# Patient Record
Sex: FEMALE | Race: WHITE | Employment: PART TIME | URBAN - METROPOLITAN AREA
[De-identification: names, ages, dates, MRNs, and addresses within clinical notes are randomized per-mention and may not be internally consistent; named-entity substitution may affect disease eponyms.]

---
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
## 2019-01-03 ENCOUNTER — OFFICE VISIT (OUTPATIENT)
Dept: FAMILY MEDICINE CLINIC | Age: 19
End: 2019-01-03

## 2019-01-03 VITALS
OXYGEN SATURATION: 97 % | HEIGHT: 66 IN | SYSTOLIC BLOOD PRESSURE: 108 MMHG | HEART RATE: 79 BPM | TEMPERATURE: 97.9 F | WEIGHT: 129.2 LBS | BODY MASS INDEX: 20.76 KG/M2 | DIASTOLIC BLOOD PRESSURE: 60 MMHG | RESPIRATION RATE: 18 BRPM

## 2019-01-03 DIAGNOSIS — R53.81 MALAISE AND FATIGUE: ICD-10-CM

## 2019-01-03 DIAGNOSIS — N94.6 DYSMENORRHEA: Primary | ICD-10-CM

## 2019-01-03 DIAGNOSIS — R53.83 MALAISE AND FATIGUE: ICD-10-CM

## 2019-01-03 DIAGNOSIS — Z00.00 ENCOUNTER FOR MEDICAL EXAMINATION TO ESTABLISH CARE: ICD-10-CM

## 2019-01-03 DIAGNOSIS — Z86.69 HX OF MIGRAINE HEADACHES: ICD-10-CM

## 2019-01-03 LAB
BILIRUB UR QL STRIP: NEGATIVE
GLUCOSE UR-MCNC: NEGATIVE MG/DL
HCG URINE, QL. (POC): NEGATIVE
KETONES P FAST UR STRIP-MCNC: NEGATIVE MG/DL
PH UR STRIP: 7.5 [PH] (ref 4.6–8)
PROT UR QL STRIP: NORMAL
SP GR UR STRIP: 1.02 (ref 1–1.03)
UA UROBILINOGEN AMB POC: NORMAL (ref 0.2–1)
URINALYSIS CLARITY POC: NORMAL
URINALYSIS COLOR POC: NORMAL
URINE BLOOD POC: NORMAL
URINE LEUKOCYTES POC: NEGATIVE
URINE NITRITES POC: NEGATIVE
VALID INTERNAL CONTROL?: YES

## 2019-01-03 NOTE — PROGRESS NOTES
CHIEF COMPLAINT:  
Chief Complaint Patient presents with  Menstrual Problem  
  painful menses HISTORY OF PRESENT ILLNESS:  
18F with h/o painful menses and has at times had heavy menses. She is sexually active. Has never had a pap smear before. Denies any odor or discharge. No suspicious lesions. She is still having a fairly heavy menses having only started 4-days ago. LMP: 12/30/2018 PAST MEDICAL HISTORY: 
Past Medical History:  
Diagnosis Date  Headache PAST SURGICAL HISTORY: 
Past Surgical History:  
Procedure Laterality Date  HX HEENT    
 
 
 
MEDICATIONS: 
Not on medications. ALLERGIES: 
No Known Allergies 
  
SOCIAL HISTORY:  
Social History Socioeconomic History  Marital status: SINGLE Spouse name: Not on file  Number of children: Not on file  Years of education: Not on file  Highest education level: Sophomore in college - [employer] Social Needs  Financial resource strain: Not on file  Food insecurity - worry: Not on file  Food insecurity - inability: Not on file  Transportation needs - medical: Not on file  Transportation needs - non-medical: Not on file Occupational History  Not on file Tobacco Use  Smoking status: Never Smoker  Smokeless tobacco: Never Used Substance and Sexual Activity  Alcohol use: Yes Comment: occaisionally  Drug use: No  
 Sexual activity: Yes  
  Partners: Male Birth control/protection: Condom FAMILY HISTORY:  
History reviewed. No pertinent family history. REVIEW OF SYSTEMS: 
Review of Systems - Negative except for documented in the HPI. PHYSICAL EXAMINATION: 
/60 (BP 1 Location: Left arm, BP Patient Position: Sitting)   Pulse 79   Temp 97.9 °F (36.6 °C) (Oral)   Resp 18   Ht 5' 6\" (1.676 m)   Wt 129 lb 3.2 oz (58.6 kg)   LMP 12/30/2018 (Exact Date)   SpO2 97%   BMI 20.85 kg/m² Visit Vitals /60 (BP 1 Location: Left arm, BP Patient Position: Sitting) Pulse 79 Temp 97.9 °F (36.6 °C) (Oral) Resp 18 Ht 5' 6\" (1.676 m) Wt 129 lb 3.2 oz (58.6 kg) LMP 12/30/2018 (Exact Date) SpO2 97% BMI 20.85 kg/m² General:  Alert, cooperative, no distress. Head:  Normocephalic, without obvious abnormality, atraumatic. Eyes:  Conjunctivae/corneas clear. Pupils equal, round, reactive to light. Extraocular movements intact. Lungs:   Clear to auscultation bilaterally. Chest wall:  No tenderness or deformity. Heart:  Regular rate and rhythm, S1, S2 normal, no murmur, click, rub, or gallop. Extremities: Extremities normal, atraumatic, no cyanosis or edema. Pulses: 2+ and symmetric all extremities. Skin: Skin color, texture, turgor normal. No rashes or lesions. Lymph nodes: Cervical, supraclavicular, and axillary nodes normal.  
Neurologic: CNII-XII intact. Normal strength, sensation, and reflexes throughout. LABORATORY DATA: 
Results for orders placed or performed in visit on 01/03/19 METABOLIC PANEL, COMPREHENSIVE Result Value Ref Range Glucose 79 65 - 99 mg/dL BUN 6 6 - 20 mg/dL Creatinine 0.61 0.57 - 1.00 mg/dL GFR est non- >59 mL/min/1.73 GFR est  >59 mL/min/1.73  
 BUN/Creatinine ratio 10 9 - 23 Sodium 141 134 - 144 mmol/L Potassium 4.3 3.5 - 5.2 mmol/L Chloride 107 (H) 96 - 106 mmol/L  
 CO2 18 (L) 20 - 29 mmol/L Calcium 9.9 8.7 - 10.2 mg/dL Protein, total 7.7 6.0 - 8.5 g/dL Albumin 4.9 3.5 - 5.5 g/dL GLOBULIN, TOTAL 2.8 1.5 - 4.5 g/dL A-G Ratio 1.8 1.2 - 2.2 Bilirubin, total 0.4 0.0 - 1.2 mg/dL Alk. phosphatase 73 39 - 117 IU/L  
 AST (SGOT) 27 0 - 40 IU/L  
 ALT (SGPT) 16 0 - 32 IU/L  
CBC WITH AUTOMATED DIFF Result Value Ref Range WBC 4.4 3.4 - 10.8 x10E3/uL  
 RBC 4.44 3.77 - 5.28 x10E6/uL HGB 13.5 11.1 - 15.9 g/dL HCT 38.8 34.0 - 46.6 %  MCV 87 79 - 97 fL  
 MCH 30.4 26.6 - 33.0 pg  
 MCHC 34.8 31.5 - 35.7 g/dL  
 RDW 12.9 12.3 - 15.4 % PLATELET 906 598 - 976 x10E3/uL NEUTROPHILS 58 Not Estab. % Lymphocytes 31 Not Estab. % MONOCYTES 9 Not Estab. % EOSINOPHILS 1 Not Estab. % BASOPHILS 1 Not Estab. %  
 ABS. NEUTROPHILS 2.6 1.4 - 7.0 x10E3/uL Abs Lymphocytes 1.3 0.7 - 3.1 x10E3/uL  
 ABS. MONOCYTES 0.4 0.1 - 0.9 x10E3/uL  
 ABS. EOSINOPHILS 0.0 0.0 - 0.4 x10E3/uL  
 ABS. BASOPHILS 0.1 0.0 - 0.2 x10E3/uL IMMATURE GRANULOCYTES 0 Not Estab. %  
 ABS. IMM. GRANS. 0.0 0.0 - 0.1 x10E3/uL THYROID CASCADE PROFILE Result Value Ref Range TSH 1.280 0.450 - 4.500 uIU/mL AMB POC URINALYSIS DIP STICK MANUAL W/O MICRO Result Value Ref Range Color (UA POC) CIT Group Clarity (UA POC) Cloudy Glucose (UA POC) Negative Negative Bilirubin (UA POC) Negative Negative Ketones (UA POC) Negative Negative Specific gravity (UA POC) 1.020 1.001 - 1.035 Blood (UA POC) 3+ Negative pH (UA POC) 7.5 4.6 - 8.0 Protein (UA POC) 1+ Negative Urobilinogen (UA POC) 0.2 mg/dL 0.2 - 1 Nitrites (UA POC) Negative Negative Leukocyte esterase (UA POC) Negative Negative AMB POC URINE PREGNANCY TEST, VISUAL COLOR COMPARISON Result Value Ref Range VALID INTERNAL CONTROL POC Yes HCG urine, Ql. (POC) Negative Negative IMPRESSION AND PLAN:  
Diagnoses and all orders for this visit: 1. Dysmenorrhea -     AMB POC URINALYSIS DIP STICK MANUAL W/O MICRO 
-     AMB POC URINE PREGNANCY TEST, VISUAL COLOR COMPARISON 
-     METABOLIC PANEL, COMPREHENSIVE 2. Hx of migraine headaches 3. Encounter for medical examination to establish care 4. Malaise and fatigue 
-     CBC WITH AUTOMATED DIFF 
-     THYROID CASCADE PROFILE She will return for full physical and pap smear. We discussed worrisome symptoms. Reviewed tests done in the office.   
 
I have discussed the diagnosis with the patient and the intended treatment plan as seen in the above orders. The patient has received an after-visit summary and questions were answered concerning future plans. Asked to return should symptoms worsen or not improve with treatment. Any pending labs and studies will be relayed to patient when they become available. Pt verbalizes understanding of plan of care and denies further questions or concerns at this time. ' 
 
Follow-up Disposition: 
Return if symptoms worsen or fail to improve. Marcin Larson MD 
 
Patient Instructions Painful Menstrual Cramps in Teens: Care Instructions Your Care Instructions Painful menstrual cramps (called dysmenorrhea) are one of the most common reasons women seek medical attention. Painful periods can cause cramping in the back, thighs, and belly. You may also have diarrhea, constipation, or nausea. Some women get dizzy. Pain medicine and home treatment can help ease your cramps. Follow-up care is a key part of your treatment and safety. Be sure to make and go to all appointments, and call your doctor if you are having problems. It's also a good idea to know your test results and keep a list of the medicines you take. How can you care for yourself at home? · Take anti-inflammatory medicines to reduce pain, such as ibuprofen (Advil, Motrin) and naproxen (Aleve), for pain from cramps. ? Start taking the recommended dose of pain medicine as soon as you start to feel pain or the day before your period starts. Keep taking the medicine for as many days as your cramps last. 
? If anti-inflammatory medicines do not relieve the pain, try acetaminophen (Tylenol). ? Do not take two or more pain medicines at the same time unless the doctor told you to. Many pain medicines have acetaminophen, which is Tylenol. Too much acetaminophen (Tylenol) can be harmful. ? Talk to your doctor or pharmacist before you take any of these medicines.  They may not be safe if you are taking other medicines or have other health problems. ? Be safe with medicines. Read and follow all instructions on the label. · Put a warm water bottle, a heating pad set on low, or a warm cloth on your belly. Heat improves blood flow and may relieve pelvic pain. · Lie down and put a pillow under your knees, or lie on your side and bring your knees up to your chest. This will help relieve back pressure. · Use pads instead of tampons. · Get plenty of exercise every day. This improves blood flow and may decrease pain. Go for a walk or jog, ride your bike, or play sports with friends. When should you call for help? Call your doctor now or seek immediate medical care if: 
  · You have severe vaginal bleeding.  
  · You have new or worse belly or pelvic pain.  
 Watch closely for changes in your health, and be sure to contact your doctor if: 
  · You have unusual vaginal bleeding.  
  · You do not get better as expected. Where can you learn more? Go to http://alexis-christel.info/. Enter J523 in the search box to learn more about \"Painful Menstrual Cramps in Teens: Care Instructions. \" Current as of: May 15, 2018 Content Version: 11.8 © 5997-3742 Healthwise, [employer]. Care instructions adapted under license by [employer] (which disclaims liability or warranty for this information). If you have questions about a medical condition or this instruction, always ask your healthcare professional. [place] [address] any warranty or liability for your use of this information.

## 2019-01-03 NOTE — PROGRESS NOTES
Identified pt with two pt identifiers(name and ). No chief complaint on file. Health Maintenance Due Topic  DTaP/Tdap/Td series (1 - Tdap)  HPV Age 9Y-34Y (1 - Female 3-dose series)  Influenza Age 5 to Adult Wt Readings from Last 3 Encounters:  
No data found for [employer] Temp Readings from Last 3 Encounters:  
No data found for Temp BP Readings from Last 3 Encounters:  
No data found for BP Pulse Readings from Last 3 Encounters:  
No data found for Pulse Learning Assessment: 
:  
 
Learning Assessment 1/3/2019 PRIMARY LEARNER Patient HIGHEST LEVEL OF EDUCATION - PRIMARY LEARNER  2 YEARS OF COLLEGE  
BARRIERS PRIMARY LEARNER NONE  
CO-LEARNER CAREGIVER No  
PRIMARY LANGUAGE ENGLISH  
LEARNER PREFERENCE PRIMARY LISTENING  
  DEMONSTRATION  
ANSWERED BY Clementina Bosch RELATIONSHIP SELF Depression Screening: 
:  
 
PHQ over the last two weeks 1/3/2019 Little interest or pleasure in doing things Not at all Feeling down, depressed, irritable, or hopeless Not at all Total Score PHQ 2 0 Fall Risk Assessment: 
:  
 
No flowsheet data found. Abuse Screening: 
:  
 
Abuse Screening Questionnaire 1/3/2019 Do you ever feel afraid of your partner? Dianne Manus Are you in a relationship with someone who physically or mentally threatens you? Dianne Manus Is it safe for you to go home? Palma Soliman Coordination of Care Questionnaire: 
:  
 
1) Have you been to an emergency room, urgent care clinic since your last visit? Yes [id number] Hospitalized since your last visit? no          
 
2) Have you seen or consulted any other health care providers outside of [address] [place] [address] since your last visit? no  (Include any pap smears or colon screenings in this section.) 3) Do you have an Advance Directive on file? no 
Are you interested in receiving information about Advance Directives? no 
 
Reviewed record in preparation for visit and have obtained necessary documentation. Medication reconciliation up to date and corrected with patient at this time.

## 2019-01-03 NOTE — PATIENT INSTRUCTIONS
Painful Menstrual Cramps in Teens: Care Instructions Your Care Instructions Painful menstrual cramps (called dysmenorrhea) are one of the most common reasons women seek medical attention. Painful periods can cause cramping in the back, thighs, and belly. You may also have diarrhea, constipation, or nausea. Some women get dizzy. Pain medicine and home treatment can help ease your cramps. Follow-up care is a key part of your treatment and safety. Be sure to make and go to all appointments, and call your doctor if you are having problems. It's also a good idea to know your test results and keep a list of the medicines you take. How can you care for yourself at home? · Take anti-inflammatory medicines to reduce pain, such as ibuprofen (Advil, Motrin) and naproxen (Aleve), for pain from cramps. ? Start taking the recommended dose of pain medicine as soon as you start to feel pain or the day before your period starts. Keep taking the medicine for as many days as your cramps last. 
? If anti-inflammatory medicines do not relieve the pain, try acetaminophen (Tylenol). ? Do not take two or more pain medicines at the same time unless the doctor told you to. Many pain medicines have acetaminophen, which is Tylenol. Too much acetaminophen (Tylenol) can be harmful. ? Talk to your doctor or pharmacist before you take any of these medicines. They may not be safe if you are taking other medicines or have other health problems. ? Be safe with medicines. Read and follow all instructions on the label. · Put a warm water bottle, a heating pad set on low, or a warm cloth on your belly. Heat improves blood flow and may relieve pelvic pain. · Lie down and put a pillow under your knees, or lie on your side and bring your knees up to your chest. This will help relieve back pressure. · Use pads instead of tampons. · Get plenty of exercise every day.  This improves blood flow and may decrease pain. Go for a walk or jog, ride your bike, or play sports with friends. When should you call for help? Call your doctor now or seek immediate medical care if: 
  · You have severe vaginal bleeding.  
  · You have new or worse belly or pelvic pain.  
 Watch closely for changes in your health, and be sure to contact your doctor if: 
  · You have unusual vaginal bleeding.  
  · You do not get better as expected. Where can you learn more? Go to http://alexis-christel.info/. Enter N247 in the search box to learn more about \"Painful Menstrual Cramps in Teens: Care Instructions. \" Current as of: May 15, 2018 Content Version: 11.8 © 2523-3052 Healthwise, Incorporated. Care instructions adapted under license by [employer] (which disclaims liability or warranty for this information). If you have questions about a medical condition or this instruction, always ask your healthcare professional. [address] any warranty or liability for your use of this information.

## 2019-01-04 LAB
ALBUMIN SERPL-MCNC: 4.9 G/DL (ref 3.5–5.5)
ALBUMIN/GLOB SERPL: 1.8 {RATIO} (ref 1.2–2.2)
ALP SERPL-CCNC: 73 IU/L (ref 39–117)
ALT SERPL-CCNC: 16 IU/L (ref 0–32)
AST SERPL-CCNC: 27 IU/L (ref 0–40)
BASOPHILS # BLD AUTO: 0.1 X10E3/UL (ref 0–0.2)
BASOPHILS NFR BLD AUTO: 1 %
BILIRUB SERPL-MCNC: 0.4 MG/DL (ref 0–1.2)
BUN SERPL-MCNC: 6 MG/DL (ref 6–20)
BUN/CREAT SERPL: 10 (ref 9–23)
CALCIUM SERPL-MCNC: 9.9 MG/DL (ref 8.7–10.2)
CHLORIDE SERPL-SCNC: 107 MMOL/L (ref 96–106)
CO2 SERPL-SCNC: 18 MMOL/L (ref 20–29)
CREAT SERPL-MCNC: 0.61 MG/DL (ref 0.57–1)
EOSINOPHIL # BLD AUTO: 0 X10E3/UL (ref 0–0.4)
EOSINOPHIL NFR BLD AUTO: 1 %
ERYTHROCYTE [DISTWIDTH] IN BLOOD BY AUTOMATED COUNT: 12.9 % (ref 12.3–15.4)
GLOBULIN SER CALC-MCNC: 2.8 G/DL (ref 1.5–4.5)
GLUCOSE SERPL-MCNC: 79 MG/DL (ref 65–99)
HCT VFR BLD AUTO: 38.8 % (ref 34–46.6)
HGB BLD-MCNC: 13.5 G/DL (ref 11.1–15.9)
IMM GRANULOCYTES # BLD AUTO: 0 X10E3/UL (ref 0–0.1)
IMM GRANULOCYTES NFR BLD AUTO: 0 %
LYMPHOCYTES # BLD AUTO: 1.3 X10E3/UL (ref 0.7–3.1)
LYMPHOCYTES NFR BLD AUTO: 31 %
MCH RBC QN AUTO: 30.4 PG (ref 26.6–33)
MCHC RBC AUTO-ENTMCNC: 34.8 G/DL (ref 31.5–35.7)
MCV RBC AUTO: 87 FL (ref 79–97)
MONOCYTES # BLD AUTO: 0.4 X10E3/UL (ref 0.1–0.9)
MONOCYTES NFR BLD AUTO: 9 %
NEUTROPHILS # BLD AUTO: 2.6 X10E3/UL (ref 1.4–7)
NEUTROPHILS NFR BLD AUTO: 58 %
PLATELET # BLD AUTO: 212 X10E3/UL (ref 150–379)
POTASSIUM SERPL-SCNC: 4.3 MMOL/L (ref 3.5–5.2)
PROT SERPL-MCNC: 7.7 G/DL (ref 6–8.5)
RBC # BLD AUTO: 4.44 X10E6/UL (ref 3.77–5.28)
SODIUM SERPL-SCNC: 141 MMOL/L (ref 134–144)
TSH SERPL DL<=0.005 MIU/L-ACNC: 1.28 UIU/ML (ref 0.45–4.5)
WBC # BLD AUTO: 4.4 X10E3/UL (ref 3.4–10.8)

## 2019-01-10 ENCOUNTER — OFFICE VISIT (OUTPATIENT)
Dept: FAMILY MEDICINE CLINIC | Age: 19
End: 2019-01-10

## 2019-01-10 ENCOUNTER — HOSPITAL ENCOUNTER (OUTPATIENT)
Dept: LAB | Age: 19
Discharge: HOME | End: 2019-01-10
Payer: COMMERCIAL

## 2019-01-10 VITALS
SYSTOLIC BLOOD PRESSURE: 100 MMHG | WEIGHT: 128 LBS | HEIGHT: 66 IN | HEART RATE: 74 BPM | TEMPERATURE: 98 F | DIASTOLIC BLOOD PRESSURE: 56 MMHG | OXYGEN SATURATION: 98 % | BODY MASS INDEX: 20.57 KG/M2 | RESPIRATION RATE: 20 BRPM

## 2019-01-10 DIAGNOSIS — N76.1 CHRONIC VAGINITIS: ICD-10-CM

## 2019-01-10 DIAGNOSIS — Z01.419 WELL WOMAN EXAM WITH ROUTINE GYNECOLOGICAL EXAM: Primary | ICD-10-CM

## 2019-01-10 DIAGNOSIS — Z12.4 SCREENING FOR CERVICAL CANCER: ICD-10-CM

## 2019-01-10 PROCEDURE — 88175 CYTOPATH C/V AUTO FLUID REDO: CPT

## 2019-01-10 PROCEDURE — 87624 HPV HI-RISK TYP POOLED RSLT: CPT

## 2019-01-10 NOTE — PATIENT INSTRUCTIONS
HPV Vaccine: Care Instructions Your Care Instructions The HPV (human papillomavirus) vaccine protects against HPV. HPV is a common sexually transmitted infection (STI). There are many types of HPV. Some types of the virus can cause genital warts in men and women. Other types can cause cervical or oral cancer and some uncommon cancers, such as anal and vaginal cancer. Experts recommend that girls and boys age 6 or 15 get the HPV vaccine, but the vaccine can be given from age 5 to 32. Children ages 5 to 15 get the vaccine in a series of two shots over 6 months. Children age 13 and older get the vaccine as a three-dose series. For the vaccine to work best, all shots in the series must be given. The best time to get the vaccine is before a person becomes sexually active. This is because the vaccine works best before there is any chance of infection with HPV. When the vaccine is given at this time, it can prevent almost all infection by the types of HPV the vaccine guards against. If someone has already been infected with the virus, the vaccine does not provide protection against the virus. Having the HPV vaccine does not change a woman's need for Pap tests. Women who have had the HPV vaccine should follow the same Pap test schedule as women who have not had the vaccine. Follow-up care is a key part of your treatment and safety. Be sure to make and go to all appointments, and call your doctor if you are having problems. It's also a good idea to know your test results and keep a list of the medicines you take. How can you care for yourself at home? · Common side effects of getting the vaccine include headache, fever, and redness or swelling at the site of the shot. Take an over-the-counter pain medicine, such as acetaminophen (Tylenol) or ibuprofen (Advil, Motrin), if you have any of these side effects after the shot. Be safe with medicines. Read and follow all instructions on the label. · Put ice or a cold pack on the sore area for 10 to 20 minutes at a time. Put a thin cloth between the ice and your skin. · If you are a parent of a child who's getting the shot, talk to your child about HPV and the vaccine. It's a chance to teach your child about safer sex and STIs. Having your child get the shot doesn't mean you're giving your child permission to have sex. When should you call for help? Call 911 anytime you think you may need emergency care. For example, call if: 
  · You have symptoms of a severe allergic reaction. These may include: 
? Sudden raised, red areas (hives) all over your body. ? Swelling of the throat, mouth, lips, or tongue. ? Trouble breathing. ? Passing out (losing consciousness). Or you may feel very lightheaded or suddenly feel weak, confused, or restless.  
 Call your doctor now or seek immediate medical care if: 
  · You have symptoms of an allergic reaction, such as: ? A rash or hives (raised, red areas on the skin). ? Itching. ? Swelling. ? Belly pain, nausea, or vomiting.  
  · You have a fever for more than 1 day.  
 Watch closely for changes in your health, and be sure to contact your doctor if you have any problems. Where can you learn more? Go to http://alexis-christel.info/. Enter C525 in the search box to learn more about \"HPV Vaccine: Care Instructions. \" Current as of: June 18, 2018 Content Version: 11.8 © 0639-9139 [employer]. Care instructions adapted under license by [employer] (which disclaims liability or warranty for this information). If you have questions about a medical condition or this instruction, always ask your healthcare professional. [place] [address] any warranty or liability for your use of this information. Well Visit, Ages 25 to 48: Care Instructions Your Care Instructions Physical exams can help you stay healthy.  Your doctor has checked your overall health and may have suggested ways to take good care of yourself. He or she also may have recommended tests. At home, you can help prevent illness with healthy eating, regular exercise, and other steps. Follow-up care is a key part of your treatment and safety. Be sure to make and go to all appointments, and call your doctor if you are having problems. It's also a good idea to know your test results and keep a list of the medicines you take. How can you care for yourself at home? · Reach and stay at a healthy weight. This will lower your risk for many problems, such as obesity, diabetes, heart disease, and high blood pressure. · Get at least 30 minutes of physical activity on most days of the week. Walking is a good choice. You also may want to do other activities, such as running, swimming, cycling, or playing tennis or team sports. Discuss any changes in your exercise program with your doctor. · Do not smoke or allow others to smoke around you. If you need help quitting, talk to your doctor about stop-smoking programs and medicines. These can increase your chances of quitting for good. · Talk to your doctor about whether you have any risk factors for sexually transmitted infections (STIs). Having one sex partner (who does not have STIs and does not have sex with anyone else) is a good way to avoid these infections. · Use birth control if you do not want to have children at this time. Talk with your doctor about the choices available and what might be best for you. · Protect your skin from too much sun. When you're outdoors from 10 a.m. to 4 p.m., stay in the shade or cover up with clothing and a hat with a wide brim. Wear sunglasses that block UV rays. Even when it's cloudy, put broad-spectrum sunscreen (SPF 30 or higher) on any exposed skin. · See a dentist one or two times a year for checkups and to have your teeth cleaned. · Wear a seat belt in the car. · Drink alcohol in moderation, if at all. That means no more than 2 drinks a day for men and 1 drink a day for women. Follow your doctor's advice about when to have certain tests. These tests can spot problems early. For everyone · Cholesterol. Have the fat (cholesterol) in your blood tested after age 21. Your doctor will tell you how often to have this done based on your age, family history, or other things that can increase your risk for heart disease. · Blood pressure. Have your blood pressure checked during a routine doctor visit. Your doctor will tell you how often to check your blood pressure based on your age, your blood pressure results, and other factors. · Vision. Talk with your doctor about how often to have a glaucoma test. 
· Diabetes. Ask your doctor whether you should have tests for diabetes. · Colon cancer. Have a test for colon cancer at age 48. You may have one of several tests. If you are younger than 48, you may need a test earlier if you have any risk factors. Risk factors include whether you already had a precancerous polyp removed from your colon or whether your parent, brother, sister, or child has had colon cancer. For women · Breast exam and mammogram. Talk to your doctor about when you should have a clinical breast exam and a mammogram. Medical experts differ on whether and how often women under 50 should have these tests. Your doctor can help you decide what is right for you. · Pap test and pelvic exam. Begin Pap tests at age 24. A Pap test is the best way to find cervical cancer. The test often is part of a pelvic exam. Ask how often to have this test. 
· Tests for sexually transmitted infections (STIs). Ask whether you should have tests for STIs. You may be at risk if you have sex with more than one person, especially if your partners do not wear condoms. For men · Tests for sexually transmitted infections (STIs).  Ask whether you should have tests for STIs. You may be at risk if you have sex with more than one person, especially if you do not wear a condom. · Testicular cancer exam. Ask your doctor whether you should check your testicles regularly. · Prostate exam. Talk to your doctor about whether you should have a blood test (called a PSA test) for prostate cancer. Experts differ on whether and when men should have this test. Some experts suggest it if you are older than 39 and are -American or have a father or brother who got prostate cancer when he was younger than 72. When should you call for help? Watch closely for changes in your health, and be sure to contact your doctor if you have any problems or symptoms that concern you. Where can you learn more? Go to http://alexis-christel.info/. Enter P072 in the search box to learn more about \"Well Visit, Ages 25 to 48: Care Instructions. \" Current as of: March 29, 2018 Content Version: 11.8 © 8985-4191 [employer]. Care instructions adapted under license by [employer] (which disclaims liability or warranty for this information). If you have questions about a medical condition or this instruction, always ask your healthcare professional. [address] any warranty or liability for your use of this information.

## 2019-01-10 NOTE — LETTER
1/10/2019 11:58 AM 
 
Ms. Dashawn Hill [address] [facility] [place] [phone] [id number] Dear Dashawn Hill: 
 
Please find your most recent results below. Results for orders placed or performed in visit on 01/03/19 METABOLIC PANEL, COMPREHENSIVE Result Value Ref Range Glucose 79 65 - 99 mg/dL BUN 6 6 - 20 mg/dL Creatinine 0.61 0.57 - 1.00 mg/dL GFR est non- >59 mL/min/1.73 GFR est  >59 mL/min/1.73  
 BUN/Creatinine ratio 10 9 - 23 Sodium 141 134 - 144 mmol/L Potassium 4.3 3.5 - 5.2 mmol/L Chloride 107 (H) 96 - 106 mmol/L  
 CO2 18 (L) 20 - 29 mmol/L Calcium 9.9 8.7 - 10.2 mg/dL Protein, total 7.7 6.0 - 8.5 g/dL Albumin 4.9 3.5 - 5.5 g/dL GLOBULIN, TOTAL 2.8 1.5 - 4.5 g/dL A-G Ratio 1.8 1.2 - 2.2 Bilirubin, total 0.4 0.0 - 1.2 mg/dL Alk. phosphatase 73 39 - 117 IU/L  
 AST (SGOT) 27 0 - 40 IU/L  
 ALT (SGPT) 16 0 - 32 IU/L  
CBC WITH AUTOMATED DIFF Result Value Ref Range WBC 4.4 3.4 - 10.8 x10E3/uL  
 RBC 4.44 3.77 - 5.28 x10E6/uL HGB 13.5 11.1 - 15.9 g/dL HCT 38.8 34.0 - 46.6 % MCV 87 79 - 97 fL  
 MCH 30.4 26.6 - 33.0 pg  
 MCHC 34.8 31.5 - 35.7 g/dL  
 RDW 12.9 12.3 - 15.4 % PLATELET 914 319 - 682 x10E3/uL NEUTROPHILS 58 Not Estab. % Lymphocytes 31 Not Estab. % MONOCYTES 9 Not Estab. % EOSINOPHILS 1 Not Estab. % BASOPHILS 1 Not Estab. %  
 ABS. NEUTROPHILS 2.6 1.4 - 7.0 x10E3/uL Abs Lymphocytes 1.3 0.7 - 3.1 x10E3/uL  
 ABS. MONOCYTES 0.4 0.1 - 0.9 x10E3/uL  
 ABS. EOSINOPHILS 0.0 0.0 - 0.4 x10E3/uL  
 ABS. BASOPHILS 0.1 0.0 - 0.2 x10E3/uL IMMATURE GRANULOCYTES 0 Not Estab. %  
 ABS. IMM. GRANS. 0.0 0.0 - 0.1 x10E3/uL THYROID CASCADE PROFILE Result Value Ref Range TSH 1.280 0.450 - 4.500 uIU/mL AMB POC URINALYSIS DIP STICK MANUAL W/O MICRO Result Value Ref Range Color (UA POC) Hildy Goody Clarity (UA POC) Cloudy Glucose (UA POC) Negative Negative Bilirubin (UA POC) Negative Negative Ketones (UA POC) Negative Negative Specific gravity (UA POC) 1.020 1.001 - 1.035 Blood (UA POC) 3+ Negative pH (UA POC) 7.5 4.6 - 8.0 Protein (UA POC) 1+ Negative Urobilinogen (UA POC) 0.2 mg/dL 0.2 - 1 Nitrites (UA POC) Negative Negative Leukocyte esterase (UA POC) Negative Negative AMB POC URINE PREGNANCY TEST, VISUAL COLOR COMPARISON Result Value Ref Range VALID INTERNAL CONTROL POC Yes HCG urine, Ql. (POC) Negative Negative RECOMMENDATIONS: 
 
Labs were excellent! We will await the pap smear and go from there. No change in diet or medications. Repeat in 12-months. Please call me if you have any questions: [phone] Sincerely, Agustin Castaneda MD

## 2019-01-10 NOTE — PROGRESS NOTES
Identified pt with two pt identifiers(name and ). Chief Complaint Patient presents with  Gyn Exam  
  Pap Health Maintenance Due Topic  DTaP/Tdap/Td series (1 - Tdap)  HPV Age 9Y-34Y (1 - Female 3-dose series)  Influenza Age 5 to Adult Wt Readings from Last 3 Encounters:  
19 129 lb 3.2 oz (58.6 kg) (54 %, Z= 0.10)* * Growth percentiles are based on CDC (Girls, 2-20 Years) data. Temp Readings from Last 3 Encounters:  
19 97.9 °F (36.6 °C) (Oral) BP Readings from Last 3 Encounters:  
19 108/60 (29 %, Z = -0.56 /  20 %, Z = -0.86)* *BP percentiles are based on the 2017 AAP Clinical Practice Guideline for girls Pulse Readings from Last 3 Encounters:  
19 79 Learning Assessment: 
:  
 
Learning Assessment 1/3/2019 PRIMARY LEARNER Patient HIGHEST LEVEL OF EDUCATION - PRIMARY LEARNER  2 YEARS OF COLLEGE  
BARRIERS PRIMARY LEARNER NONE  
CO-LEARNER CAREGIVER No  
PRIMARY LANGUAGE ENGLISH  
LEARNER PREFERENCE PRIMARY LISTENING  
  DEMONSTRATION  
ANSWERED BY Roberto Hutchinson RELATIONSHIP SELF Depression Screening: 
:  
 
PHQ over the last two weeks 1/10/2019 Little interest or pleasure in doing things Not at all Feeling down, depressed, irritable, or hopeless Not at all Total Score PHQ 2 0 Fall Risk Assessment: 
:  
 
No flowsheet data found. Abuse Screening: 
:  
 
Abuse Screening Questionnaire 1/3/2019 Do you ever feel afraid of your partner? Clearnce Beams Are you in a relationship with someone who physically or mentally threatens you? Clearnce Beams Is it safe for you to go home? Shahriar Arredondo Coordination of Care Questionnaire: 
:  
 
1) Have you been to an emergency room, urgent care clinic since your last visit? no  
Hospitalized since your last visit? no          
 
2) Have you seen or consulted any other health care providers outside of [address] [place] [address] since your last visit? no  (Include any pap smears or colon screenings in this section.) 3) Do you have an Advance Directive on file? no 
Are you interested in receiving information about Advance Directives? no 
 
Reviewed record in preparation for visit and have obtained necessary documentation. Medication reconciliation up to date and corrected with patient at this time.

## 2019-01-10 NOTE — PROGRESS NOTES
Subjective:  
22 y.o. female for Well Woman Check. Patient's last menstrual period was 12/30/2018 (exact date). Social History: has sex with males. Pertinent past medical hstory: no history of HTN, DVT, CAD, DM, liver disease, migraines or smoking. Patient Active Problem List  
 Diagnosis Date Noted  Hx of migraine headaches 01/03/2019 No Known Allergies Past Medical History:  
Diagnosis Date  Headache Past Surgical History:  
Procedure Laterality Date  HX HEENT History reviewed. No pertinent family history. Social History Tobacco Use  Smoking status: Never Smoker  Smokeless tobacco: Never Used Substance Use Topics  Alcohol use: Yes Comment: occaisionally ROS:  Feeling well. No dyspnea or chest pain on exertion. No abdominal pain, change in bowel habits, black or bloody stools. No urinary tract symptoms. GYN ROS: normal menses, no abnormal bleeding, pelvic pain or discharge, no breast pain or new or enlarging lumps on self exam. No neurological complaints. Objective:  
 
Visit Vitals /56 (BP 1 Location: Left arm, BP Patient Position: Sitting) Pulse 74 Temp 98 °F (36.7 °C) (Oral) Resp 20 Ht 5' 6\" (1.676 m) Wt 128 lb (58.1 kg) LMP 12/30/2018 (Exact Date) SpO2 98% BMI 20.66 kg/m² The patient appears well, alert, oriented x 3, in no distress. ENT normal.  Neck supple. No adenopathy or thyromegaly. JOSE. Lungs are clear, good air entry, no wheezes, rhonchi or rales. S1 and S2 normal, no murmurs, regular rate and rhythm. Abdomen soft without tenderness, guarding, mass or organomegaly. Extremities show no edema, normal peripheral pulses. Neurological is normal, no focal findings. BREAST EXAM: breasts appear normal, no suspicious masses, no skin or nipple changes or axillary nodes PELVIC EXAM: normal external genitalia, vulva, vagina, cervix, uterus and adnexa Assessment/Plan: Diagnoses and all orders for this visit: 
 
1. Well woman exam with routine gynecological exam 
 Anticipatory guidance discussed. Immunizations reviewed HM updated. 2. Chronic vaginitis 
-     NUSWAB VAGINITIS PLUS 3. Screening for cervical cancer -     PAP IG, APTIMA HPV AND RFX 16/18,45 ([id number]); Future I have discussed the diagnosis with the patient and the intended treatment plan as seen in the above orders. The patient has received an after-visit summary and questions were answered concerning future plans. Asked to return should symptoms worsen or not improve with treatment. Any pending labs and studies will be relayed to patient when they become available. Pt verbalizes understanding of plan of care and denies further questions or concerns at this time. Follow-up Disposition: 
Return if symptoms worsen or fail to improve. Rudy Mcbride MD 
 
Patient Instructions HPV Vaccine: Care Instructions Your Care Instructions The HPV (human papillomavirus) vaccine protects against HPV. HPV is a common sexually transmitted infection (STI). There are many types of HPV. Some types of the virus can cause genital warts in men and women. Other types can cause cervical or oral cancer and some uncommon cancers, such as anal and vaginal cancer. Experts recommend that girls and boys age 6 or 15 get the HPV vaccine, but the vaccine can be given from age 5 to 32. Children ages 5 to 15 get the vaccine in a series of two shots over 6 months. Children age 13 and older get the vaccine as a three-dose series. For the vaccine to work best, all shots in the series must be given. The best time to get the vaccine is before a person becomes sexually active. This is because the vaccine works best before there is any chance of infection with HPV.  When the vaccine is given at this time, it can prevent almost all infection by the types of HPV the vaccine guards against. If someone has already been infected with the virus, the vaccine does not provide protection against the virus. Having the HPV vaccine does not change a woman's need for Pap tests. Women who have had the HPV vaccine should follow the same Pap test schedule as women who have not had the vaccine. Follow-up care is a key part of your treatment and safety. Be sure to make and go to all appointments, and call your doctor if you are having problems. It's also a good idea to know your test results and keep a list of the medicines you take. How can you care for yourself at home? · Common side effects of getting the vaccine include headache, fever, and redness or swelling at the site of the shot. Take an over-the-counter pain medicine, such as acetaminophen (Tylenol) or ibuprofen (Advil, Motrin), if you have any of these side effects after the shot. Be safe with medicines. Read and follow all instructions on the label. · Put ice or a cold pack on the sore area for 10 to 20 minutes at a time. Put a thin cloth between the ice and your skin. · If you are a parent of a child who's getting the shot, talk to your child about HPV and the vaccine. It's a chance to teach your child about safer sex and STIs. Having your child get the shot doesn't mean you're giving your child permission to have sex. When should you call for help? Call 911 anytime you think you may need emergency care. For example, call if: 
  · You have symptoms of a severe allergic reaction. These may include: 
? Sudden raised, red areas (hives) all over your body. ? Swelling of the throat, mouth, lips, or tongue. ? Trouble breathing. ? Passing out (losing consciousness). Or you may feel very lightheaded or suddenly feel weak, confused, or restless.  
 Call your doctor now or seek immediate medical care if: 
  · You have symptoms of an allergic reaction, such as: ? A rash or hives (raised, red areas on the skin). ? Itching. ? Swelling. ? Belly pain, nausea, or vomiting.  
  · You have a fever for more than 1 day.  
 Watch closely for changes in your health, and be sure to contact your doctor if you have any problems. Where can you learn more? Go to http://alexis-christel.info/. Enter C525 in the search box to learn more about \"HPV Vaccine: Care Instructions. \" Current as of: June 18, 2018 Content Version: 11.8 © 5782-3955 [employer]. Care instructions adapted under license by [employer] (which disclaims liability or warranty for this information). If you have questions about a medical condition or this instruction, always ask your healthcare professional. [address] any warranty or liability for your use of this information. Well Visit, Ages 25 to 48: Care Instructions Your Care Instructions Physical exams can help you stay healthy. Your doctor has checked your overall health and may have suggested ways to take good care of yourself. He or she also may have recommended tests. At home, you can help prevent illness with healthy eating, regular exercise, and other steps. Follow-up care is a key part of your treatment and safety. Be sure to make and go to all appointments, and call your doctor if you are having problems. It's also a good idea to know your test results and keep a list of the medicines you take. How can you care for yourself at home? · Reach and stay at a healthy weight. This will lower your risk for many problems, such as obesity, diabetes, heart disease, and high blood pressure. · Get at least 30 minutes of physical activity on most days of the week. Walking is a good choice. You also may want to do other activities, such as running, swimming, cycling, or playing tennis or team sports. Discuss any changes in your exercise program with your doctor. · Do not smoke or allow others to smoke around you.  If you need help quitting, talk to your doctor about stop-smoking programs and medicines. These can increase your chances of quitting for good. · Talk to your doctor about whether you have any risk factors for sexually transmitted infections (STIs). Having one sex partner (who does not have STIs and does not have sex with anyone else) is a good way to avoid these infections. · Use birth control if you do not want to have children at this time. Talk with your doctor about the choices available and what might be best for you. · Protect your skin from too much sun. When you're outdoors from 10 a.m. to 4 p.m., stay in the shade or cover up with clothing and a hat with a wide brim. Wear sunglasses that block UV rays. Even when it's cloudy, put broad-spectrum sunscreen (SPF 30 or higher) on any exposed skin. · See a dentist one or two times a year for checkups and to have your teeth cleaned. · Wear a seat belt in the car. · Drink alcohol in moderation, if at all. That means no more than 2 drinks a day for men and 1 drink a day for women. Follow your doctor's advice about when to have certain tests. These tests can spot problems early. For everyone · Cholesterol. Have the fat (cholesterol) in your blood tested after age 21. Your doctor will tell you how often to have this done based on your age, family history, or other things that can increase your risk for heart disease. · Blood pressure. Have your blood pressure checked during a routine doctor visit. Your doctor will tell you how often to check your blood pressure based on your age, your blood pressure results, and other factors. · Vision. Talk with your doctor about how often to have a glaucoma test. 
· Diabetes. Ask your doctor whether you should have tests for diabetes. · Colon cancer. Have a test for colon cancer at age 48. You may have one of several tests.  If you are younger than 48, you may need a test earlier if you have any risk factors. Risk factors include whether you already had a precancerous polyp removed from your colon or whether your parent, brother, sister, or child has had colon cancer. For women · Breast exam and mammogram. Talk to your doctor about when you should have a clinical breast exam and a mammogram. Medical experts differ on whether and how often women under 50 should have these tests. Your doctor can help you decide what is right for you. · Pap test and pelvic exam. Begin Pap tests at age 24. A Pap test is the best way to find cervical cancer. The test often is part of a pelvic exam. Ask how often to have this test. 
· Tests for sexually transmitted infections (STIs). Ask whether you should have tests for STIs. You may be at risk if you have sex with more than one person, especially if your partners do not wear condoms. For men · Tests for sexually transmitted infections (STIs). Ask whether you should have tests for STIs. You may be at risk if you have sex with more than one person, especially if you do not wear a condom. · Testicular cancer exam. Ask your doctor whether you should check your testicles regularly. · Prostate exam. Talk to your doctor about whether you should have a blood test (called a PSA test) for prostate cancer. Experts differ on whether and when men should have this test. Some experts suggest it if you are older than [address] and are -American or have a father or brother who got prostate cancer when he was younger than 72. When should you call for help? Watch closely for changes in your health, and be sure to contact your doctor if you have any problems or symptoms that concern you. Where can you learn more? Go to http://alexis-christel.info/. Enter P072 in the search box to learn more about \"Well Visit, Ages 25 to 48: Care Instructions. \" Current as of: March 29, 2018 Content Version: 11.8 © 5180-8393 [employer]. Care instructions adapted under license by [employer] (which disclaims liability or warranty for this information). If you have questions about a medical condition or this instruction, always ask your healthcare professional. [address] any warranty or liability for your use of this information.

## 2019-01-13 LAB
A VAGINAE DNA VAG QL NAA+PROBE: NORMAL SCORE
BVAB2 DNA VAG QL NAA+PROBE: NORMAL SCORE
C ALBICANS DNA VAG QL NAA+PROBE: NEGATIVE
C GLABRATA DNA VAG QL NAA+PROBE: NEGATIVE
C TRACH RRNA SPEC QL NAA+PROBE: NEGATIVE
MEGA1 DNA VAG QL NAA+PROBE: NORMAL SCORE
N GONORRHOEA RRNA SPEC QL NAA+PROBE: NEGATIVE
T VAGINALIS RRNA SPEC QL NAA+PROBE: NEGATIVE

## 2021-06-09 ENCOUNTER — OFFICE VISIT (OUTPATIENT)
Dept: FAMILY MEDICINE CLINIC | Age: 21
End: 2021-06-09
Payer: COMMERCIAL

## 2021-06-09 VITALS
HEART RATE: 96 BPM | OXYGEN SATURATION: 97 % | WEIGHT: 147 LBS | HEIGHT: 66 IN | SYSTOLIC BLOOD PRESSURE: 108 MMHG | TEMPERATURE: 98.8 F | RESPIRATION RATE: 18 BRPM | DIASTOLIC BLOOD PRESSURE: 72 MMHG | BODY MASS INDEX: 23.63 KG/M2

## 2021-06-09 DIAGNOSIS — E55.9 VITAMIN D DEFICIENCY: ICD-10-CM

## 2021-06-09 DIAGNOSIS — N92.6 IRREGULAR MENSES: Primary | ICD-10-CM

## 2021-06-09 DIAGNOSIS — Z11.59 ENCOUNTER FOR HEPATITIS C SCREENING TEST FOR LOW RISK PATIENT: ICD-10-CM

## 2021-06-09 LAB
HCG URINE, QL. (POC): NEGATIVE
VALID INTERNAL CONTROL?: YES

## 2021-06-09 PROCEDURE — 99214 OFFICE O/P EST MOD 30 MIN: CPT | Performed by: INTERNAL MEDICINE

## 2021-06-09 PROCEDURE — 81025 URINE PREGNANCY TEST: CPT | Performed by: INTERNAL MEDICINE

## 2021-06-09 NOTE — PATIENT INSTRUCTIONS
Painful Menstrual Cramps: Care Instructions Your Care Instructions Painful menstrual cramps are very common. Many women go to the doctor because of bad cramps when they get their period. You may have cramps in your back, thighs, and belly. You may also have diarrhea, constipation, or nausea. Some women also get dizzy. Pain medicine and home treatment can help you feel better. Follow-up care is a key part of your treatment and safety. Be sure to make and go to all appointments, and call your doctor if you are having problems. It's also a good idea to know your test results and keep a list of the medicines you take. How can you care for yourself at home? · Take anti-inflammatory medicines for pain. Ibuprofen (Advil, Motrin) and naproxen (Aleve) usually work better than aspirin. ? Be safe with medicines. Talk to your doctor or pharmacist before you take any of these medicines. They may not be safe if you take other medicines or have other health problems. ? Start taking the recommended dose of pain medicine as soon as you start to feel pain. Or you can start on the day before your period. Keep taking the medicine for as many days as you have cramps. ? If anti-inflammatory medicines don't help, try acetaminophen (Tylenol). ? Do not take two or more pain medicines at the same time unless the doctor told you to. Many pain medicines have acetaminophen, which is Tylenol. Too much acetaminophen (Tylenol) can be harmful. ? Read and follow all instructions on the label. · Put a heating pad set on low or a hot water bottle on your belly. Or take a warm bath. Heat improves blood flow and may help with pain. · Lie down and put a pillow under your knees. Or lie on your side and bring your knees up to your chest. This will help with any back pressure. · Get at least 30 minutes of exercise on most days of the week. This improves blood flow and may decrease pain. Walking is a good choice.  You also may want to do other activities, such as running, swimming, cycling, or playing tennis or team sports. When should you call for help? Call your doctor now or seek immediate medical care if: 
  · You have new or worse belly or pelvic pain.  
  · You have severe vaginal bleeding. Watch closely for changes in your health, and be sure to contact your doctor if: 
  · You have unusual vaginal bleeding.  
  · You do not get better as expected. Where can you learn more? Go to http://www.[employer] Enter [id number] in the search box to learn more about \"Painful Menstrual Cramps: Care Instructions. \" Current as of: July 17, 2020               Content Version: 12.8 © 2006-2021 Healthwise, [employer]. Care instructions adapted under license by [employer] (which disclaims liability or warranty for this information). If you have questions about a medical condition or this instruction, always ask your healthcare professional. [address] any warranty or liability for your use of this information.

## 2021-06-09 NOTE — PROGRESS NOTES
Identified pt with two pt identifiers(name and ). Chief Complaint   Patient presents with   [facility] Control     wants to start medication     Irregular Menses     last period . Pregnancy test was neg last Wednesday        Health Maintenance Due   Topic    Hepatitis C Screening     HPV Age 9Y-34Y (1 - 2-dose series)    DTaP/Tdap/Td series (1 - Tdap)       Wt Readings from Last 3 Encounters:   21 147 lb (66.7 kg)   01/10/19 128 lb (58.1 kg) (52 %, Z= 0.04)*   19 129 lb 3.2 oz (58.6 kg) (54 %, Z= 0.10)*     * Growth percentiles are based on CDC (Girls, 2-20 Years) data. Temp Readings from Last 3 Encounters:   21 98.8 °F (37.1 °C) (Oral)   01/10/19 98 °F (36.7 °C) (Oral)   19 97.9 °F (36.6 °C) (Oral)     BP Readings from Last 3 Encounters:   21 108/72   01/10/19 100/56   19 108/60     Pulse Readings from Last 3 Encounters:   21 96   01/10/19 74   19 79         Learning Assessment:  :     Learning Assessment 1/3/2019   PRIMARY LEARNER Patient   HIGHEST LEVEL OF EDUCATION - PRIMARY LEARNER  2 YEARS OF COLLEGE   BARRIERS PRIMARY LEARNER NONE   CO-LEARNER CAREGIVER No   PRIMARY LANGUAGE ENGLISH   LEARNER PREFERENCE PRIMARY LISTENING     DEMONSTRATION   ANSWERED BY Lakeisha Womack   RELATIONSHIP SELF       Depression Screening:  :     3 most recent PHQ Screens 2021   Little interest or pleasure in doing things Not at all   Feeling down, depressed, irritable, or hopeless Not at all   Total Score PHQ 2 0       Fall Risk Assessment:  :     No flowsheet data found. Abuse Screening:  :     Abuse Screening Questionnaire 2021 1/3/2019   Do you ever feel afraid of your partner? N N   Are you in a relationship with someone who physically or mentally threatens you? N N   Is it safe for you to go home?  Y Y       Coordination of Care Questionnaire:  :     1) Have you been to an emergency room, urgent care clinic since your last visit? no   Hospitalized since your last visit? no             2) Have you seen or consulted any other health care providers outside of [address] [place] [address] since your last visit? no  (Include any pap smears or colon screenings in this section.)    3) Do you have an Advance Directive on file? no  Are you interested in receiving information about Advance Directives?  no      Reviewed record in preparation for visit and have obtained necessary documentation

## 2021-06-10 LAB
25(OH)D3 SERPL-MCNC: 21.5 NG/ML (ref 30–100)
ALBUMIN SERPL-MCNC: 4.1 G/DL (ref 3.5–5)
ALBUMIN/GLOB SERPL: 1.2 {RATIO} (ref 1.1–2.2)
ALP SERPL-CCNC: 92 U/L (ref 45–117)
ALT SERPL-CCNC: 18 U/L (ref 12–78)
ANION GAP SERPL CALC-SCNC: 6 MMOL/L (ref 5–15)
AST SERPL-CCNC: 14 U/L (ref 15–37)
BASOPHILS # BLD: 0 K/UL (ref 0–0.1)
BASOPHILS NFR BLD: 1 % (ref 0–1)
BILIRUB SERPL-MCNC: 0.3 MG/DL (ref 0.2–1)
BUN SERPL-MCNC: 7 MG/DL (ref 6–20)
BUN/CREAT SERPL: 14 (ref 12–20)
CALCIUM SERPL-MCNC: 9.9 MG/DL (ref 8.5–10.1)
CHLORIDE SERPL-SCNC: 106 MMOL/L (ref 97–108)
CO2 SERPL-SCNC: 26 MMOL/L (ref 21–32)
CREAT SERPL-MCNC: 0.49 MG/DL (ref 0.55–1.02)
DIFFERENTIAL METHOD BLD: NORMAL
EOSINOPHIL # BLD: 0.1 K/UL (ref 0–0.4)
EOSINOPHIL NFR BLD: 1 % (ref 0–7)
ERYTHROCYTE [DISTWIDTH] IN BLOOD BY AUTOMATED COUNT: 12.5 % (ref 11.5–14.5)
GLOBULIN SER CALC-MCNC: 3.3 G/DL (ref 2–4)
GLUCOSE SERPL-MCNC: 82 MG/DL (ref 65–100)
HCT VFR BLD AUTO: 42.5 % (ref 35–47)
HCV AB SERPL QL IA: NONREACTIVE
HCV COMMENT,HCGAC: NORMAL
HGB BLD-MCNC: 13.4 G/DL (ref 11.5–16)
IMM GRANULOCYTES # BLD AUTO: 0 K/UL (ref 0–0.04)
IMM GRANULOCYTES NFR BLD AUTO: 0 % (ref 0–0.5)
LYMPHOCYTES # BLD: 1.9 K/UL (ref 0.8–3.5)
LYMPHOCYTES NFR BLD: 32 % (ref 12–49)
MCH RBC QN AUTO: 29.3 PG (ref 26–34)
MCHC RBC AUTO-ENTMCNC: 31.5 G/DL (ref 30–36.5)
MCV RBC AUTO: 93 FL (ref 80–99)
MONOCYTES # BLD: 0.5 K/UL (ref 0–1)
MONOCYTES NFR BLD: 9 % (ref 5–13)
NEUTS SEG # BLD: 3.4 K/UL (ref 1.8–8)
NEUTS SEG NFR BLD: 57 % (ref 32–75)
NRBC # BLD: 0 K/UL (ref 0–0.01)
NRBC BLD-RTO: 0 PER 100 WBC
PLATELET # BLD AUTO: 214 K/UL (ref 150–400)
PMV BLD AUTO: 12.3 FL (ref 8.9–12.9)
POTASSIUM SERPL-SCNC: 4.2 MMOL/L (ref 3.5–5.1)
PROT SERPL-MCNC: 7.4 G/DL (ref 6.4–8.2)
RBC # BLD AUTO: 4.57 M/UL (ref 3.8–5.2)
SODIUM SERPL-SCNC: 138 MMOL/L (ref 136–145)
WBC # BLD AUTO: 6 K/UL (ref 3.6–11)

## 2021-06-11 ENCOUNTER — TELEPHONE (OUTPATIENT)
Dept: FAMILY MEDICINE CLINIC | Age: 21
End: 2021-06-11

## 2021-06-11 DIAGNOSIS — E55.9 VITAMIN D DEFICIENCY: Primary | ICD-10-CM

## 2021-06-11 LAB — TSH SERPL-ACNC: 1.74 UIU/ML (ref 0.45–4.5)

## 2021-06-11 RX ORDER — ERGOCALCIFEROL 1.25 MG/1
50000 CAPSULE ORAL
Qty: 4 CAPSULE | Refills: 3 | Status: SHIPPED | OUTPATIENT
Start: 2021-06-11 | End: 2021-09-09

## 2021-06-11 NOTE — PROGRESS NOTES
Please let patient know that her labs were stable/normal.   Vitamin D was very low, however. I recommend that we start her on Vitamin D 50,000 international units for 6-weeks and then recheck her vitamin D. Should se run out of the 50,000 units, then she should start vitamin D 2000 international units DAILY over the counter.

## 2021-06-11 NOTE — TELEPHONE ENCOUNTER
----- Message from Lauren Mcdowell MD sent at 6/11/2021  7:05 AM EDT -----  Please let patient know that her labs were stable/normal.   Vitamin D was very low, however. I recommend that we start her on Vitamin D 50,000 international units for 6-weeks and then recheck her vitamin D. Should se run out of the 50,000 units, then she should start vitamin D 2000 international units DAILY over the counter.

## 2021-06-12 NOTE — PROGRESS NOTES
Chief Complaint   Patient presents with   [facility] Control     wants to start medication     Irregular Menses     last period April 12. Pregnancy test was neg last Wednesday       Assessment/ Plan:   Diagnoses and all orders for this visit:    1. Irregular menses  -     AMB POC URINE PREGNANCY TEST, VISUAL COLOR COMPARISON  -     METABOLIC PANEL, COMPREHENSIVE; Future  -     CBC WITH AUTOMATED DIFF; Future  -     THYROID CASCADE PROFILE; Future    2. Vitamin D deficiency  -     VITAMIN D, 25 HYDROXY; Future    3. Encounter for hepatitis C screening test for low risk patient  -     HEPATITIS C AB; Future    Other orders  -     human papillomav vac,9-thania,PF, (GARDASIL) susp injection; 0.5 mL by IntraMUSCular route once for 1 dose.  -     diph,pertuss,acel,,tetanus vac,PF, (ADACEL) 2 Lf-(2.5-5-3-5 mcg)-5Lf/0.5 mL syrg vaccine; 0.5 mL by IntraMUSCular route once for 1 dose. I have discussed the diagnosis with the patient and the intended treatment plan as seen in the above orders. The patient has received an after-visit summary and questions were answered concerning future plans. Asked to return should symptoms worsen or not improve with treatment. Any pending labs and studies will be relayed to patient when they become available. Pt verbalizes understanding of plan of care and denies further questions or concerns at this time. Follow-up and Dispositions    · Return if symptoms worsen or fail to improve. Subjective:   Jeff Yi is a 23 y.o. female who presents with h/o irregular menses and also dysmenorrhea. She is interested in possibly starting OCP to regulate her menses. She had a normal high -risk pap done about 2-years ago. She had been regular until the last several years. She also has had fatigue and malaise and we discussed getting screening labs prior to starting therapy.      Patient Active Problem List    Diagnosis Date Noted    Hx of migraine headaches 01/03/2019 Current Outpatient Medications   Medication Sig Dispense Refill    ergocalciferol (ERGOCALCIFEROL) 1,250 mcg (50,000 unit) capsule Take 1 Capsule by mouth every seven (7) days for 90 days. 4 Capsule 3         No Known Allergies      Past Medical History:   Diagnosis Date    Headache          Past Surgical History:   Procedure Laterality Date    HX HEENT           No family history on file. Social History     Tobacco Use    Smoking status: Never Smoker    Smokeless tobacco: Never Used   Substance Use Topics    Alcohol use: Yes     Comment: occaisionally          Review of Systems    Pertinent items are noted in HPI. Objective:     Vitals:    06/09/21 1021   BP: 108/72   Pulse: 96   Resp: 18   Temp: 98.8 °F (37.1 °C)   TempSrc: Oral   SpO2: 97%   Weight: 147 lb (66.7 kg)   Height: 5' 6\" (1.676 m)       General: alert, cooperative, no distress   Mental  status: normal mood, behavior, speech, dress, motor activity, and thought processes, able to follow commands   HENT: NCAT   Neck: no visualized mass   Resp: no respiratory distress   Neuro: no gross deficits   Skin: no discoloration or lesions of concern on visible areas   Psychiatric: normal affect, consistent with stated mood, no evidence of hallucinations       LAbs:  Results for orders placed or performed in visit on 06/09/21   AMB POC URINE PREGNANCY TEST, VISUAL COLOR COMPARISON   Result Value Ref Range    VALID INTERNAL CONTROL POC Yes     HCG urine, Ql. (POC) Negative Negative       Katerina Maria MD  Patient Instructions          Painful Menstrual Cramps: Care Instructions  Your Care Instructions     Painful menstrual cramps are very common. Many women go to the doctor because of bad cramps when they get their period. You may have cramps in your back, thighs, and belly. You may also have diarrhea, constipation, or nausea. Some women also get dizzy. Pain medicine and home treatment can help you feel better.   Follow-up care is a key part of your treatment and safety. Be sure to make and go to all appointments, and call your doctor if you are having problems. It's also a good idea to know your test results and keep a list of the medicines you take. How can you care for yourself at home? · Take anti-inflammatory medicines for pain. Ibuprofen (Advil, Motrin) and naproxen (Aleve) usually work better than aspirin. ? Be safe with medicines. Talk to your doctor or pharmacist before you take any of these medicines. They may not be safe if you take other medicines or have other health problems. ? Start taking the recommended dose of pain medicine as soon as you start to feel pain. Or you can start on the day before your period. Keep taking the medicine for as many days as you have cramps. ? If anti-inflammatory medicines don't help, try acetaminophen (Tylenol). ? Do not take two or more pain medicines at the same time unless the doctor told you to. Many pain medicines have acetaminophen, which is Tylenol. Too much acetaminophen (Tylenol) can be harmful. ? Read and follow all instructions on the label. · Put a heating pad set on low or a hot water bottle on your belly. Or take a warm bath. Heat improves blood flow and may help with pain. · Lie down and put a pillow under your knees. Or lie on your side and bring your knees up to your chest. This will help with any back pressure. · Get at least 30 minutes of exercise on most days of the week. This improves blood flow and may decrease pain. Walking is a good choice. You also may want to do other activities, such as running, swimming, cycling, or playing tennis or team sports. When should you call for help? Call your doctor now or seek immediate medical care if:    · You have new or worse belly or pelvic pain.     · You have severe vaginal bleeding.    Watch closely for changes in your health, and be sure to contact your doctor if:    · You have unusual vaginal bleeding.     · You do not get better as expected. Where can you learn more? Go to http://www.gray.com/  Enter Z243 in the search box to learn more about \"Painful Menstrual Cramps: Care Instructions. \"  Current as of: July 17, 2020               Content Version: 12.8  © 3282-0713 [employer], [employer]. Care instructions adapted under license by [employer] (which disclaims liability or warranty for this information). If you have questions about a medical condition or this instruction, always ask your healthcare professional. [address] any warranty or liability for your use of this information.

## 2022-01-12 ENCOUNTER — VIRTUAL VISIT (OUTPATIENT)
Dept: FAMILY MEDICINE CLINIC | Age: 22
End: 2022-01-12
Payer: COMMERCIAL

## 2022-01-12 DIAGNOSIS — R09.81 NASAL CONGESTION: ICD-10-CM

## 2022-01-12 DIAGNOSIS — J02.9 SORE THROAT: Primary | ICD-10-CM

## 2022-01-12 PROCEDURE — 99213 OFFICE O/P EST LOW 20 MIN: CPT | Performed by: NURSE PRACTITIONER

## 2022-01-12 RX ORDER — AZITHROMYCIN 250 MG/1
TABLET, FILM COATED ORAL
Qty: 6 TABLET | Refills: 0 | Status: SHIPPED | OUTPATIENT
Start: 2022-01-12 | End: 2022-01-17

## 2022-01-12 NOTE — LETTER
NOTIFICATION RETURN TO WORK / SCHOOL    1/12/2022 9:12 AM    Ms. Sunny Maddox  [address] [place] [id number]      To Whom It May Concern:    Sunny Maddox is currently under the care of [address] [place] [address]. She needs to be excused from work until 1/14, due to illness. If there are questions or concerns please have the patient contact our office.         Sincerely,      Shawn Perez NP

## 2022-01-12 NOTE — PROGRESS NOTES
HISTORY OF PRESENT ILLNESS  Lisa Pelletier is a 24 y.o. female. HPI  Pt presents with \"cough congestion and sore throat\"  Visit was conducted via [employer] me  Lisa Pelletier, who was evaluated through a synchronous (real-time) audio-video encounter, and/or her healthcare decision maker, is aware that it is a billable service, with coverage as determined by her insurance carrier. She provided verbal consent to proceed: Yes, and patient identification was verified. This visit was conducted pursuant to the emergency declaration under the [facility], [address] [place] [address] authority and the [employer] and [employer] General Act. A caregiver was present when appropriate. Ability to conduct physical exam was limited. The patient was located in a state where the provider was credentialed to provide care. --Teresa Henriquez NP on 1/12/2022 at 9:09 AM            Symptoms started yesterday  Sore throat  Nasal congestion  Her symptoms have continued to worsen since yesterday  She got COVID tested yesterday, and it was negative  No fever  OTC: Dayquil and Nyquil  Review of Systems   Constitutional: Negative for fever. HENT: Positive for congestion and sore throat. Physical Exam  Constitutional:       Appearance: Normal appearance. Neurological:      Mental Status: She is alert. Psychiatric:         Mood and Affect: Mood normal.         Behavior: Behavior normal.         ASSESSMENT and PLAN    ICD-10-CM ICD-9-CM    1. Sore throat  J02.9 462 azithromycin (ZITHROMAX) 250 mg tablet   2. Nasal congestion  R09.81 478.19 azithromycin (ZITHROMAX) 250 mg tablet     Educated about taking medication as prescribed, with food  Should notify office of any continued and/or worsening of symptoms    Pt informed to return to office with worsening of symptoms, or PRN with any questions or concerns.   Pt verbalizes understanding of plan of care and denies further questions or concerns at this time.

## 2022-03-19 PROBLEM — Z86.69 HX OF MIGRAINE HEADACHES: Status: ACTIVE | Noted: 2019-01-03

## 2025-01-15 ENCOUNTER — TELEPHONE (OUTPATIENT)
Age: 25
End: 2025-01-15

## 2025-01-15 NOTE — TELEPHONE ENCOUNTER
Spoke w/ patient and confirmed name and      Scheduled appointment for GC and U/S for 25 GC will be a VV at 11:00 and her ultrasound will be at [facility] (Provided address) at 1:45    Asked patient to arrive 15 min early to fill out paperwork and informed her that she may bring two guest over the age of 12.

## 2025-02-05 ENCOUNTER — ROUTINE PRENATAL (OUTPATIENT)
Age: 25
End: 2025-02-05

## 2025-02-05 VITALS — DIASTOLIC BLOOD PRESSURE: 83 MMHG | HEART RATE: 89 BPM | SYSTOLIC BLOOD PRESSURE: 123 MMHG

## 2025-02-05 DIAGNOSIS — Z3A.18 18 WEEKS GESTATION OF PREGNANCY: Primary | ICD-10-CM

## 2025-02-05 NOTE — PROGRESS NOTES
Patient was seen 2/5/2025      Please look under media to view full consult and ultrasound report in ViewPoint.       Clifford Jacinto MD  Maternal Fetal Medicine

## 2025-02-05 NOTE — PROCEDURES
appropriate distance from the internal os.  Transverse, head to maternal right presentation.    Transvaginal ultrasound was performed to assess cervical length. The cervix measures 3.58 cm in length. There is no evidence of funneling with and without fundal pressure.  Inferior tip of the placenta measures 3.9 cm from the internal os.    * The ultrasound findings as listed above and diagnostic limitations of ultrasound imaging, including inability to exclude all anomalies, have been reviewed with the patient.  All questions and concerns addressed.    Consultation  ==========    NATASHA is 24 yrs of age,  at 18w 3d. LMP 2024, HARMONY 2025. No prior h/o antibodies. No h/o blood transfusions. NIPT normal female. Materni T21  negative.  PMH: none  PSH: wisdom teeth removal  Meds: PNV, Zyrtec    Anti-M obtained on  stated as \"no active at 37 degrees\". No titer obtained.    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 She has no prior h/o isoimmunization and there is no titer  obtained. This cold agglutin can be confirmed with the blood bank. I have repeated her antibody screen today. 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 Anti-M is usually a naturally occurring antibody of low clinical significance, although occasionally severe cases of HDFN are  seen.    Case reports: Two M+ sisters are presented, each developing hemolysis during

## 2025-02-20 LAB
BLD GP AB SCN SERPL QL: NORMAL
BLD GP AB SCN SERPL QL: POSITIVE
BLOOD GROUP ANTIBODIES SERPL: ABNORMAL
XXX BLOOD GROUP AB TITR SERPL AHG: ABNORMAL {TITER}

## 2025-03-04 DIAGNOSIS — Z34.90 PREGNANCY, UNSPECIFIED GESTATIONAL AGE: Primary | ICD-10-CM

## 2025-03-05 ENCOUNTER — ROUTINE PRENATAL (OUTPATIENT)
Age: 25
End: 2025-03-05

## 2025-03-05 VITALS — HEART RATE: 118 BPM | DIASTOLIC BLOOD PRESSURE: 75 MMHG | SYSTOLIC BLOOD PRESSURE: 119 MMHG

## 2025-03-05 VITALS — HEART RATE: 70 BPM

## 2025-03-05 DIAGNOSIS — Z34.90 PREGNANCY, UNSPECIFIED GESTATIONAL AGE: ICD-10-CM

## 2025-03-05 DIAGNOSIS — O36.1120 ISOIMMUNIZATION FROM BLOOD GROUP INCOMPATIBILITY DURING PREGNANCY IN SECOND TRIMESTER, SINGLE OR UNSPECIFIED FETUS: Primary | ICD-10-CM

## 2025-03-05 NOTE — PROCEDURES
PATIENT: NATASHA DÍAZ   -  : 2000   -  DOS:2025   -  INTERPRETING PROVIDER:Gladis Turpin,   Indication  ========    Anti M    Method  ======    Transabdominal ultrasound examination. View: Sufficient    Pregnancy  =========    Abdalla pregnancy. Number of fetuses: 1    Dating  ======    LMP on: 2024  GA by LMP 22 w + 3 d  HARMONY by LMP: 2025  Ultrasound examination on: 3/5/2025  GA by U/S based upon: AC, BPD, Femur, HC  GA by U/S 21 w + 5 d  HARMONY by U/S: 2025  Assigned: based on the LMP, selected on 2025  Assigned GA 22 w + 3 d  Assigned HARMONY: 2025    Fetal Biometry  ============    Standard  BPD 50.0 mm 21w 1d 8% Hadlock  OFD 69.4 mm 23w 1d 74% Jade  .9 mm 21w 2d 6% Hadlock  .8 mm 22w 6d 56% Hadlock  Femur 35.9 mm 21w 3d 11% Hadlock   g 22w 0d 26% Hadlock  EFW (lb) 1 lb  EFW (oz) 1 oz  EFW by: Hadlock (BPD-HC-AC-FL)  Head / Face / Neck  Nasal bone: present  Other Structures   bpm    General Evaluation  ==============    Cardiac activity present.  bpm. Fetal movements: visualized. Presentation: BREECH  Placenta: Placental site: posterior, appropriate distance from the internal os  Umbilical cord: Cord vessels: 3 vessel cord. Insertion site: central  Amniotic fluid: Amount of AF: normal. MVP 6.4 cm    Fetal Anatomy  ===========    Head / Neck  Neck: normal  Profile: normal  Face  Coronal face: normal  Nasal bone: present  Palate: normal  Maxilla: normal  Mandible: normal  Orbits: normal  4-chamber view: normal  RVOT view: normal  LVOT view: normal  3-vessel view: normal  3-vessel-trachea view: normal  Heart / Thorax  Bicaval view: normal  Ductal arch view: normal  Interventricular septum: normal  Stomach: normal  Kidneys: normal  Bladder: normal  Rt hand: normal  Rt fingers: normal  Lt hand: normal  Lt fingers: normal  Rt foot: normal  Rt toes: normal  Lt foot: normal  Lt toes: normal    Findings  =======    Intrauterine Abdalla pregnancy

## 2025-03-05 NOTE — PROGRESS NOTES
to face with the patient as well as documenting on the day of the visit.  An electronic signature was used to authenticate this note.    --LUCERO Cox - CNP

## 2025-03-05 NOTE — PROGRESS NOTES
Patient was seen 3/5/2025      Please look under media to view full consult and ultrasound report in ViewPoint.         Gladis Turpin MD   Maternal Fetal Medicine

## 2025-04-23 DIAGNOSIS — O36.1120 ISOIMMUNIZATION FROM BLOOD GROUP INCOMPATIBILITY DURING PREGNANCY IN SECOND TRIMESTER, SINGLE OR UNSPECIFIED FETUS: Primary | ICD-10-CM

## 2025-04-23 DIAGNOSIS — O36.1120 ISOIMMUNIZATION FROM BLOOD GROUP INCOMPATIBILITY DURING PREGNANCY IN SECOND TRIMESTER, SINGLE OR UNSPECIFIED FETUS: ICD-10-CM

## 2025-05-14 ENCOUNTER — ROUTINE PRENATAL (OUTPATIENT)
Age: 25
End: 2025-05-14

## 2025-05-14 VITALS — SYSTOLIC BLOOD PRESSURE: 119 MMHG | DIASTOLIC BLOOD PRESSURE: 79 MMHG | HEART RATE: 105 BPM

## 2025-05-14 DIAGNOSIS — Z34.90 PREGNANCY, UNSPECIFIED GESTATIONAL AGE: ICD-10-CM

## 2025-05-14 NOTE — PROGRESS NOTES
Patient was seen 5/14/2025      Please look under media to view full consult and ultrasound report in ViewPoint.         Gladis Turpin MD   Maternal Fetal Medicine

## 2025-05-14 NOTE — PROCEDURES
PATIENT: NATASHA DÍAZ   -  : 2000   -  DOS:2025   -  INTERPRETING PROVIDER:Gladis Turpin,   Indication  ========    Anti M    Method  ======    Transabdominal ultrasound examination. View: Sufficient    Pregnancy  =========    Abdalla pregnancy. Number of fetuses: 1    Dating  ======    LMP on: 2024  GA by LMP 32 w + 3 d  HARMONY by LMP: 2025  Ultrasound examination on: 2025  GA by U/S based upon: AC, BPD, Femur, HC  GA by U/S 32 w + 1 d  HARMONY by U/S: 2025  Assigned: based on the LMP, selected on 2025  Assigned GA 32 w + 3 d  Assigned HARMONY: 2025    Fetal Biometry  ============    Standard  BPD 81.3 mm 32w 5d 49% Hadlock  .5 mm 34w 2d 85% Jade  .4 mm 32w 5d 22% Hadlock  .7 mm 32w 3d 49% Hadlock  Femur 58.7 mm 30w 5d 5% Hadlock  EFW 1,868 g 31w 4d 26% Hadlock  EFW (lb) 4 lb  EFW (oz) 2 oz  EFW by: Hadlock (BPD-HC-AC-FL)  Extended   4.3 mm  Other Structures   bpm    General Evaluation  ==============    Cardiac activity present.  bpm. Fetal movements: visualized. Presentation: Cephalic  Placenta: Placental site: posterior, appropriate distance from the internal os  Umbilical cord: Cord vessels: 3 vessel cord. Insertion site: central  Amniotic fluid: Amount of AF: normal. MVP 4.7 cm. VICTOR M 16.9 cm. Q1 4.7 cm, Q2 4.1 cm, Q3 3.9 cm, Q4 4.2 cm    Fetal Anatomy  ===========    4-chamber view: normal  Stomach: normal  Kidneys: normal  Bladder: normal    Findings  =======    Intrauterine Abdalla pregnancy at 32w 3d by clinical dates.  EFW is 1868 g at 26%, abdominal circumference at 49%.  Amniotic fluid: normal.  Placenta is posterior, appropriate distance from the internal os.  Cephalic presentation.  The ultrasound findings as listed above and diagnostic limitations of ultrasound imaging, including inability to exclude all anomalies, have been reviewed with the patient. All  questions and concerns